# Patient Record
Sex: FEMALE | Race: WHITE | NOT HISPANIC OR LATINO | URBAN - METROPOLITAN AREA
[De-identification: names, ages, dates, MRNs, and addresses within clinical notes are randomized per-mention and may not be internally consistent; named-entity substitution may affect disease eponyms.]

---

## 2024-02-16 ENCOUNTER — TELEPHONE (OUTPATIENT)
Dept: OBSTETRICS AND GYNECOLOGY | Facility: CLINIC | Age: 34
End: 2024-02-16

## 2024-02-16 ENCOUNTER — OFFICE VISIT (OUTPATIENT)
Dept: OBSTETRICS AND GYNECOLOGY | Facility: CLINIC | Age: 34
End: 2024-02-16
Payer: COMMERCIAL

## 2024-02-16 VITALS
BODY MASS INDEX: 32.61 KG/M2 | WEIGHT: 215.2 LBS | HEIGHT: 68 IN | SYSTOLIC BLOOD PRESSURE: 120 MMHG | DIASTOLIC BLOOD PRESSURE: 78 MMHG

## 2024-02-16 DIAGNOSIS — N91.2 AMENORRHEA: ICD-10-CM

## 2024-02-16 DIAGNOSIS — N92.6 MISSED MENSES: Primary | ICD-10-CM

## 2024-02-16 LAB — PREGNANCY TEST URINE, POC: POSITIVE

## 2024-02-16 PROCEDURE — 1036F TOBACCO NON-USER: CPT | Performed by: OBSTETRICS & GYNECOLOGY

## 2024-02-16 PROCEDURE — 81025 URINE PREGNANCY TEST: CPT | Performed by: OBSTETRICS & GYNECOLOGY

## 2024-02-16 PROCEDURE — 99214 OFFICE O/P EST MOD 30 MIN: CPT | Performed by: OBSTETRICS & GYNECOLOGY

## 2024-02-16 PROCEDURE — 87086 URINE CULTURE/COLONY COUNT: CPT

## 2024-02-16 NOTE — PROGRESS NOTES
33-year-old obese -0-0-2 -American woman presents today with a missed menses.  She has a positive urine pregnancy test at home and here in the office.    A/P: Missed menses --> +UPT     -  Later child bearing info     -  PNV rx     -  PNL     -  US for dating     -  NOB packet

## 2024-02-16 NOTE — PATIENT INSTRUCTIONS
Congratulations on your pregnancy!    A prescription for prenatal vitamin has been sent to your pharmacy.  Start taking 1 tablet daily.    Review the information about later childbearing.    Return to the office in the next few weeks for your new OB visit.    Feel free to call the office with any problems, questions or concerns prior to your next scheduled visit.  Right

## 2024-02-17 LAB — BACTERIA UR CULT: NO GROWTH

## 2024-02-20 ENCOUNTER — TELEPHONE (OUTPATIENT)
Dept: OBSTETRICS AND GYNECOLOGY | Facility: CLINIC | Age: 34
End: 2024-02-20
Payer: COMMERCIAL

## 2024-02-20 NOTE — TELEPHONE ENCOUNTER
Pt returned call.  Pt verified by name and .  Pt is aware she has ultrasound scheduled for 2024.  Pt is aware she can get her blood done at the same time.  Pt has no questions at this time.

## 2024-02-20 NOTE — TELEPHONE ENCOUNTER
Pt returned call.  Pt verified by name and .  Pt states her lmp was 1/10/2024.  Pt calling states she can not get ultrasound until April she is moving to [place].  Nurse called [facility].  Pt can get ultrasound on 2024 at 0915.  Left message for pt to return call.

## 2024-02-23 ENCOUNTER — LAB (OUTPATIENT)
Dept: LAB | Facility: LAB | Age: 34
End: 2024-02-23
Payer: COMMERCIAL

## 2024-02-23 ENCOUNTER — HOSPITAL ENCOUNTER (OUTPATIENT)
Dept: RADIOLOGY | Facility: CLINIC | Age: 34
Discharge: HOME | End: 2024-02-23
Payer: COMMERCIAL

## 2024-02-23 DIAGNOSIS — N92.6 MISSED MENSES: ICD-10-CM

## 2024-02-23 LAB
ABO GROUP (TYPE) IN BLOOD: NORMAL
ANTIBODY SCREEN: NORMAL
ERYTHROCYTE [DISTWIDTH] IN BLOOD BY AUTOMATED COUNT: 13.1 % (ref 11.5–14.5)
EST. AVERAGE GLUCOSE BLD GHB EST-MCNC: 94 MG/DL
HBA1C MFR BLD: 4.9 %
HBV SURFACE AG SERPL QL IA: NONREACTIVE
HCT VFR BLD AUTO: 37.1 % (ref 36–46)
HCV AB SER QL: NONREACTIVE
HGB BLD-MCNC: 11.9 G/DL (ref 12–16)
HIV 1+2 AB+HIV1 P24 AG SERPL QL IA: NONREACTIVE
MCH RBC QN AUTO: 27.3 PG (ref 26–34)
MCHC RBC AUTO-ENTMCNC: 32.1 G/DL (ref 32–36)
MCV RBC AUTO: 85 FL (ref 80–100)
NRBC BLD-RTO: 0 /100 WBCS (ref 0–0)
PLATELET # BLD AUTO: 205 X10*3/UL (ref 150–450)
RBC # BLD AUTO: 4.36 X10*6/UL (ref 4–5.2)
REFLEX ADDED, ANEMIA PANEL: NORMAL
RH FACTOR (ANTIGEN D): NORMAL
RUBV IGG SERPL IA-ACNC: 2.7 IA
RUBV IGG SERPL QL IA: POSITIVE
TREPONEMA PALLIDUM IGG+IGM AB [PRESENCE] IN SERUM OR PLASMA BY IMMUNOASSAY: NONREACTIVE
WBC # BLD AUTO: 5.6 X10*3/UL (ref 4.4–11.3)

## 2024-02-23 PROCEDURE — 76801 OB US < 14 WKS SINGLE FETUS: CPT | Performed by: OBSTETRICS & GYNECOLOGY

## 2024-02-23 PROCEDURE — 87389 HIV-1 AG W/HIV-1&-2 AB AG IA: CPT

## 2024-02-23 PROCEDURE — 76817 TRANSVAGINAL US OBSTETRIC: CPT | Performed by: OBSTETRICS & GYNECOLOGY

## 2024-02-23 PROCEDURE — 83020 HEMOGLOBIN ELECTROPHORESIS: CPT | Performed by: OBSTETRICS & GYNECOLOGY

## 2024-02-23 PROCEDURE — 86803 HEPATITIS C AB TEST: CPT

## 2024-02-23 PROCEDURE — 76817 TRANSVAGINAL US OBSTETRIC: CPT

## 2024-02-23 PROCEDURE — 36415 COLL VENOUS BLD VENIPUNCTURE: CPT

## 2024-02-23 PROCEDURE — 86850 RBC ANTIBODY SCREEN: CPT

## 2024-02-23 PROCEDURE — 83036 HEMOGLOBIN GLYCOSYLATED A1C: CPT

## 2024-02-23 PROCEDURE — 87340 HEPATITIS B SURFACE AG IA: CPT

## 2024-02-23 PROCEDURE — 86317 IMMUNOASSAY INFECTIOUS AGENT: CPT

## 2024-02-23 PROCEDURE — 76801 OB US < 14 WKS SINGLE FETUS: CPT

## 2024-02-23 PROCEDURE — 86901 BLOOD TYPING SEROLOGIC RH(D): CPT

## 2024-02-23 PROCEDURE — 86780 TREPONEMA PALLIDUM: CPT

## 2024-02-23 PROCEDURE — 83021 HEMOGLOBIN CHROMOTOGRAPHY: CPT

## 2024-02-23 PROCEDURE — 85027 COMPLETE CBC AUTOMATED: CPT

## 2024-02-23 PROCEDURE — 86900 BLOOD TYPING SEROLOGIC ABO: CPT

## 2024-02-26 LAB
HEMOGLOBIN A2: 2.8 % (ref 2–3.5)
HEMOGLOBIN A: 97 % (ref 95.8–98)
HEMOGLOBIN F: 0.2 % (ref 0–2)
HEMOGLOBIN IDENTIFICATION INTERPRETATION: NORMAL
PATH REVIEW-HGB IDENTIFICATION: NORMAL

## 2024-04-04 ENCOUNTER — TELEPHONE (OUTPATIENT)
Dept: OBSTETRICS AND GYNECOLOGY | Facility: CLINIC | Age: 34
End: 2024-04-04
Payer: COMMERCIAL

## 2024-04-04 NOTE — TELEPHONE ENCOUNTER
----- Message from Amber Rodgers sent at 4/4/2024  7:43 AM EDT -----  Regarding: Ultrasound and OB referrals  Contact: [phone]  Hi Dr. Gomez,  I have been struggling to find an OB here. We will be in town 5/6 and 5/7. Is there anyway I can get someone to help me schedule an ultrasound for either day? (Unavailable 5/6 from 9-10am for the twins pediatrician appt). I called and I was unable to do an ultrasound scheduling over the phone. They recommended sending you a message for the referral and scheduling.    Also, you are out of town that week. Is there an OB or a few OBs you can recommend? I really need to see someone by then and have no good leads. Any help there with a few names or a referral would help me a lot. I am open to anywhere within the area who can help me as I will be about 16 weeks. Please let me know if you can help.    Thank you and I hope you and your staff had a good Easter!

## 2024-04-04 NOTE — TELEPHONE ENCOUNTER
Contacted pt and verified name and .  Pt states she relocated to [place] and cannot find OB care at this time, pt states she was told she needs a release of care and referral from her  provider in order to start care in [place].  Pt notified per  pt needs to be seen before orders can be placed.  Pt schedule appt with [facility] at the [facility] location on 24 at 8:30 am.  Pt advised to discuss and ask all concerning questions at that time.  Pt states understanding and denies having questions at this time.

## 2024-05-07 ENCOUNTER — APPOINTMENT (OUTPATIENT)
Dept: OBSTETRICS AND GYNECOLOGY | Facility: CLINIC | Age: 34
End: 2024-05-07
Payer: COMMERCIAL

## 2024-05-14 ENCOUNTER — APPOINTMENT (OUTPATIENT)
Dept: OBSTETRICS AND GYNECOLOGY | Facility: CLINIC | Age: 34
End: 2024-05-14
Payer: COMMERCIAL